# Patient Record
Sex: FEMALE | Race: OTHER | NOT HISPANIC OR LATINO | URBAN - METROPOLITAN AREA
[De-identification: names, ages, dates, MRNs, and addresses within clinical notes are randomized per-mention and may not be internally consistent; named-entity substitution may affect disease eponyms.]

---

## 2017-08-15 ENCOUNTER — EMERGENCY (EMERGENCY)
Facility: HOSPITAL | Age: 37
LOS: 1 days | Discharge: HOME | End: 2017-08-15
Admitting: EMERGENCY MEDICINE
Payer: COMMERCIAL

## 2017-08-15 VITALS
TEMPERATURE: 99 F | OXYGEN SATURATION: 98 % | HEART RATE: 84 BPM | RESPIRATION RATE: 18 BRPM | SYSTOLIC BLOOD PRESSURE: 113 MMHG | DIASTOLIC BLOOD PRESSURE: 72 MMHG

## 2017-08-15 DIAGNOSIS — J30.9 ALLERGIC RHINITIS, UNSPECIFIED: ICD-10-CM

## 2017-08-15 DIAGNOSIS — Z79.899 OTHER LONG TERM (CURRENT) DRUG THERAPY: ICD-10-CM

## 2017-08-15 DIAGNOSIS — R09.81 NASAL CONGESTION: ICD-10-CM

## 2017-08-15 PROCEDURE — 99283 EMERGENCY DEPT VISIT LOW MDM: CPT

## 2017-08-15 PROCEDURE — 99282 EMERGENCY DEPT VISIT SF MDM: CPT

## 2017-08-15 RX ORDER — PSEUDOEPHEDRINE HCL 30 MG
1 TABLET ORAL
Qty: 28 | Refills: 0 | OUTPATIENT
Start: 2017-08-15 | End: 2017-08-22

## 2017-08-15 RX ORDER — CETIRIZINE HYDROCHLORIDE 10 MG/1
1 TABLET ORAL
Qty: 30 | Refills: 0 | OUTPATIENT
Start: 2017-08-15 | End: 2017-09-14

## 2017-08-15 NOTE — ED PROVIDER NOTE - PHYSICAL EXAMINATION
Gen: NAD, AAOX3  HEENT: PERRLA, EOMI, +b/l nasal mucosal edema, no thick purulent drainage, no facial tenderness     Cardio: RRR, nml s1s2, no murmurs   Lungs: CTA b/l    Neuro: CN 2-12 intact, normal finger to nose, normal gait    Ext: no swelling or calf tenderness, DP pulse 2+b/l

## 2017-08-15 NOTE — ED PROVIDER NOTE - MEDICAL DECISION MAKING DETAILS
35 yo f c/o nasal congestion, sore throat x 1 week. No fever ,chills. +nasal mucosal edema, no purulent drainage, no frontal or maxillary sinus tenderness. Pt given sudafed and zyrtec for allergic rhinitis. I have discussed the discharge plan with the patient. The patient agrees with the plan, as discussed.  The patient understands Emergency Department diagnosis is a preliminary diagnosis often based on limited information and that the patient must adhere to the follow-up plan as discussed.  The patient understands that if the symptoms worsen or if prescribed medications do not have the desired/planned effect that the patient may return to the Emergency Department at any time for further evaluation and treatment.

## 2017-08-15 NOTE — ED PROVIDER NOTE - OBJECTIVE STATEMENT
37 yo f with no pmh c/o nasal congestion x 1 week. Associated with intermittent HA, sore throat. Pt uses nasonex which helps. Pt states in the past she has suffered from "sinus allergies." Denies fever, chills, cough, dizziness, earaches, facial pain, sick contacts, recent travel.

## 2017-08-15 NOTE — ED ADULT NURSE NOTE - OBJECTIVE STATEMENT
Patient alert and oriented x 3 came for constant sinus congestion , headache , sorethroat and fever that is worse at night . History of seasonal  allergy , been taking nasonex nasal spray and panadol for headache  for 1 week with no relief .  Denies any chest pain nor sob .Been seen and examined by TEJA russell .

## 2020-04-07 ENCOUNTER — EMERGENCY (EMERGENCY)
Facility: HOSPITAL | Age: 40
LOS: 1 days | Discharge: HOME | End: 2020-04-07
Attending: EMERGENCY MEDICINE | Admitting: EMERGENCY MEDICINE
Payer: COMMERCIAL

## 2020-04-07 VITALS — SYSTOLIC BLOOD PRESSURE: 116 MMHG | DIASTOLIC BLOOD PRESSURE: 79 MMHG

## 2020-04-07 VITALS
SYSTOLIC BLOOD PRESSURE: 133 MMHG | HEIGHT: 62 IN | HEART RATE: 119 BPM | TEMPERATURE: 98 F | WEIGHT: 169.76 LBS | OXYGEN SATURATION: 100 % | DIASTOLIC BLOOD PRESSURE: 92 MMHG | RESPIRATION RATE: 19 BRPM

## 2020-04-07 DIAGNOSIS — J12.9 VIRAL PNEUMONIA, UNSPECIFIED: ICD-10-CM

## 2020-04-07 DIAGNOSIS — R05 COUGH: ICD-10-CM

## 2020-04-07 DIAGNOSIS — R06.02 SHORTNESS OF BREATH: ICD-10-CM

## 2020-04-07 DIAGNOSIS — Z20.828 CONTACT WITH AND (SUSPECTED) EXPOSURE TO OTHER VIRAL COMMUNICABLE DISEASES: ICD-10-CM

## 2020-04-07 DIAGNOSIS — Z79.899 OTHER LONG TERM (CURRENT) DRUG THERAPY: ICD-10-CM

## 2020-04-07 PROCEDURE — 99283 EMERGENCY DEPT VISIT LOW MDM: CPT | Mod: 25

## 2020-04-07 PROCEDURE — 87635 SARS-COV-2 COVID-19 AMP PRB: CPT

## 2020-04-07 PROCEDURE — 71046 X-RAY EXAM CHEST 2 VIEWS: CPT | Mod: 26

## 2020-04-07 PROCEDURE — 99284 EMERGENCY DEPT VISIT MOD MDM: CPT | Mod: CR

## 2020-04-07 PROCEDURE — 71046 X-RAY EXAM CHEST 2 VIEWS: CPT

## 2020-04-07 RX ORDER — AZITHROMYCIN 500 MG/1
1 TABLET, FILM COATED ORAL
Qty: 4 | Refills: 0
Start: 2020-04-07 | End: 2020-04-10

## 2020-04-07 RX ORDER — AZITHROMYCIN 500 MG/1
500 TABLET, FILM COATED ORAL ONCE
Refills: 0 | Status: DISCONTINUED | OUTPATIENT
Start: 2020-04-07 | End: 2020-04-11

## 2020-04-07 RX ORDER — CETIRIZINE HYDROCHLORIDE, PSEUDOEPHEDRINE HYDROCHLORIDE 5; 120 MG/1; MG/1
1 TABLET, FILM COATED, EXTENDED RELEASE ORAL
Qty: 14 | Refills: 0
Start: 2020-04-07 | End: 2020-04-13

## 2020-04-07 RX ORDER — ACETAMINOPHEN 500 MG
650 TABLET ORAL ONCE
Refills: 0 | Status: COMPLETED | OUTPATIENT
Start: 2020-04-07 | End: 2020-04-07

## 2020-04-07 RX ADMIN — Medication 650 MILLIGRAM(S): at 03:58

## 2020-04-07 NOTE — ED PROVIDER NOTE - OBJECTIVE STATEMENT
38 y/o F, with no past medical hx, presents to the ER with cold like symptoms for the past 2 days. Pt has a dry cough, SOB, nasal congestion, runny nose, and developed a fever over 100.4 F. Denies a sore throat, ear pain, abd pain and n/v/d. Pt has a daughter with asthma and is concerned her symptoms are consistent with covid-19.

## 2020-04-07 NOTE — ED PROVIDER NOTE - PATIENT PORTAL LINK FT
You can access the FollowMyHealth Patient Portal offered by [facility] by registering at the following website: [email or web address]. By joining [employer]’s FollowMyHealth portal, you will also be able to view your health information using other applications (apps) compatible with our system.

## 2020-04-07 NOTE — ED PROVIDER NOTE - CARE PLAN
Principal Discharge DX:	Cough  Secondary Diagnosis:	SOB (shortness of breath)  Secondary Diagnosis:	Viral pneumonia

## 2020-04-07 NOTE — ED PROVIDER NOTE - ENMT, MLM
Airway patent, POSITIVE NASAL CONGESTION, CLEAR NASAL DISCHARGE, TMs clear. Mouth with normal mucosa. Throat has no vesicles, no oropharyngeal exudates, no erythema and uvula is midline.

## 2020-04-07 NOTE — ED ADULT NURSE REASSESSMENT NOTE - NS ED NURSE REASSESS COMMENT FT1
ER physician evaluation complete, orders received, pt medicated per MAR, nasopharyngeal swab obtained, sent to lab. pt tolerated well. Pt ambulatory to radiology and back, waiting results.

## 2020-04-07 NOTE — ED PROVIDER NOTE - NSFOLLOWUPINSTRUCTIONS_ED_ALL_ED_FT
Recommend to self isolate until results of covid -19 are available.  Frequent Hand washing and wear your  mask. Chest xray at right lower lobe of the lung appears mild haze which is questionable for pneumonia. Will start you on Azithromycin to cover any other possible bacterial lung infection.  Monitor symptoms if the symptoms worsen and you are having shortness of breath return to ED.

## 2020-04-07 NOTE — ED PROVIDER NOTE - ATTENDING CONTRIBUTION TO CARE
39F overweight, gerd, otherwise healthy, c/o few days subjective fever/cough/malaise/myalgias and now sob. pt anxious and c/f covid. no ha/dizziness, no neck pain/stiffness, no photophobia/vision changes, no cp, no abd pain/n/v, no diarrhea, no hematochezia/melena, no dysuria, no rash. avss. nontoxic. NAD. no active cp. no acute resp distress. covid pending. ekg and cxr w/o acute abnl. s/p azithromycin/tessalon pearles. no indication for inpatient hospitalization at this time. will dc w/ outpatient pcp fu, zpack, strict return precautions. pt agrees w/ plan. questions answered.     I saw and discussed the care of the pt directly with the ACP while the pt was in the ED. i have reviewed the ACP note and agree w/ the history, exam and plan of care other than as noted above.

## 2020-04-08 LAB — SARS-COV-2 RNA SPEC QL NAA+PROBE: DETECTED
